# Patient Record
Sex: FEMALE | HISPANIC OR LATINO | Employment: UNEMPLOYED | URBAN - METROPOLITAN AREA
[De-identification: names, ages, dates, MRNs, and addresses within clinical notes are randomized per-mention and may not be internally consistent; named-entity substitution may affect disease eponyms.]

---

## 2024-09-23 ENCOUNTER — OFFICE VISIT (OUTPATIENT)
Dept: URGENT CARE | Facility: URGENT CARE | Age: 34
End: 2024-09-23

## 2024-09-23 VITALS
HEIGHT: 61 IN | SYSTOLIC BLOOD PRESSURE: 121 MMHG | DIASTOLIC BLOOD PRESSURE: 77 MMHG | HEART RATE: 81 BPM | TEMPERATURE: 98.5 F | WEIGHT: 107 LBS | BODY MASS INDEX: 20.2 KG/M2 | RESPIRATION RATE: 16 BRPM | OXYGEN SATURATION: 99 %

## 2024-09-23 DIAGNOSIS — G40.909 SEIZURE DISORDER (H): Primary | ICD-10-CM

## 2024-09-23 PROCEDURE — 99203 OFFICE O/P NEW LOW 30 MIN: CPT | Performed by: PHYSICIAN ASSISTANT

## 2024-09-23 RX ORDER — CARBAMAZEPINE 200 MG/1
200 TABLET ORAL 3 TIMES DAILY
Qty: 90 TABLET | Refills: 1 | Status: SHIPPED | OUTPATIENT
Start: 2024-09-23 | End: 2024-10-23

## 2024-09-23 RX ORDER — CARBAMAZEPINE 200 MG/1
TABLET ORAL
COMMUNITY
Start: 2024-08-08 | End: 2024-09-23

## 2024-09-23 NOTE — PROGRESS NOTES
"URGENT CARE VISIT:    SUBJECTIVE:   Joesph Douglas is a 33 year old female who presents for refill of seizure medication. She moved from [place] and does not have PCP yet. She made an appointment but the earliest was October. She has been well controlled on her med.    PMH: History reviewed. No pertinent past medical history.  Allergies: Patient has no allergy information on record.  Medications:   Current Outpatient Medications   Medication Sig Dispense Refill    carBAMazepine (TEGRETOL) 200 MG tablet Take 1 tablet (200 mg) by mouth 3 times daily. 90 tablet 1     Social History:   Social History     Tobacco Use    Smoking status: Never     Passive exposure: Never    Smokeless tobacco: Never   Substance Use Topics    Alcohol use: Not on file       ROS: ROS otherwise found to be negative except as noted above.     OBJECTIVE:  /77   Pulse 81   Temp 98.5  F (36.9  C) (Oral)   Resp 16   Ht 1.549 m (5' 1\")   Wt 48.5 kg (107 lb)   LMP  (LMP Unknown)   SpO2 99%   BMI 20.22 kg/m    General: WDWN in NAD  Eyes: EOMI,  PERRL, conjunctiva clear  Cardiac: RRR without murmurs, rubs, or gallops.  Respiratory: LCTAB without adventitious sounds. Non-labored breathing.  Abdominal: Active bowel sounds in all four quadrants. Soft, non-tender without guarding or rebound.   Extremities: No peripheral edema or tenderness, peripheral pulses normal  Musculoskeletal: No gross deformities noted, no erythema, FROM noted in all extremities  Neuro: Normal strength and tone, sensory exam grossly normal,  normal speech and mentation  Integumentary: No suspicious rashes or lesions.       ASSESSMENT:     ICD-10-CM    1. Seizure disorder (H)  G40.909 carBAMazepine (TEGRETOL) 200 MG tablet           PLAN:  Patient Instructions   Patient moved here from [place]. She has not established PCP. She made an appointment but its not until October. I refilled her anticonvulsant medication to bridge her. Seek emergency care if you develop " severe seizures, dizziness, or confusion.      Patient verbalized understanding and is agreeable to plan. The patient was discharged ambulatory and in stable condition.    Michelle Ashley PA-C ....................  9/23/2024   12:16 PM

## 2024-09-23 NOTE — PATIENT INSTRUCTIONS
Patient moved here from [place]. She has not established PCP. She made an appointment but its not until October. I refilled her anticonvulsant medication to bridge her. Seek emergency care if you develop severe seizures, dizziness, or confusion.